# Patient Record
(demographics unavailable — no encounter records)

---

## 2016-12-31 NOTE — ED PEDIATRIC NURSE NOTE - DISCHARGE TEACHING
patient discharged as per MD to home in stable condition accompanied by parent.   patient speaking coherently and ambulating well without assistance.

## 2016-12-31 NOTE — ED PEDIATRIC NURSE NOTE - OBJECTIVE STATEMENT
patient arrived asleep while intoxicated do to unknown amount of alcohol.  parents at bedside, patient resting comfortably on stretcher with no signs of cardiac or respiratory distress currently.  pending results of initial labs

## 2017-01-01 NOTE — ED PROVIDER NOTE - SIGNIFICANT NEGATIVE FINDINGS
no headache, no chest pain, no SOB, no palpitations, no n/v/d, no urinary symptoms, no bleeding. no neuro changes.

## 2017-12-11 NOTE — ED PEDIATRIC NURSE NOTE - DISCHARGE DATE/TIME
Refill request for alprazolam. Last visit was 8/9/17    Last dispensed 11/3/17 #30 per PDMP  Please sign or advise otherwise. Thank you.    01-Jan-2017 04:49

## 2022-05-03 NOTE — ED PROVIDER NOTE - CROS ED EYES ALL NEG
END OF SHIFT NOTE:    Intake/Output  No intake/output data recorded. Voiding: YES  Catheter: NO  Drain:              Stool:  0 occurrences. Emesis:  0 occurrences. VITAL SIGNS  Patient Vitals for the past 12 hrs:   Temp Pulse Resp BP SpO2   05/03/22 1943 97.3 °F (36.3 °C) 96  (!) 132/90 96 %   05/03/22 1632 97.5 °F (36.4 °C) 90 18 123/77 92 %   05/03/22 1309 97.6 °F (36.4 °C) 73 18 137/83 98 %   05/03/22 1240  68 16 139/75 99 %   05/03/22 1235  65 14 (!) 147/76 99 %   05/03/22 1230  76 16 (!) 143/71 99 %   05/03/22 1229  72 16 (!) 140/73 98 %   05/03/22 1228  72 16 (!) 142/74 99 %   05/03/22 1220 98 °F (36.7 °C) 72 16 (!) 141/72 99 %   05/03/22 1215  71 16  97 %   05/03/22 1210  74 16  98 %   05/03/22 1205  66 16  98 %   05/03/22 1200  71 16  97 %   05/03/22 1155  70 16  95 %   05/03/22 1154  74 16 (!) 141/76 96 %   05/03/22 1151 97.5 °F (36.4 °C) 84 16 (!) 141/76 95 %   05/03/22 0804 98.6 °F (37 °C) 90 18 (!) 155/73 96 %       Pain Assessment  Pain 1  Pain Scale 1: Numeric (0 - 10) (05/03/22 1630)  Pain Intensity 1: 2 (05/03/22 1630)  Patient Stated Pain Goal: 0 (05/03/22 1309)  Pain Reassessment 1: Yes (05/03/22 1309)  Pain Location 1: Abdomen (05/03/22 1630)  Pain Description 1: Aching (05/03/22 1459)  Pain Intervention(s) 1: Rest (05/03/22 1630)    Ambulating  Yes    Additional Information: Patient has been ambulating the halls several times, urinated after yap removal     Shift report given to oncoming nurse at the bedside.     Consuella Dolin, RN negative...
